# Patient Record
Sex: MALE | Race: BLACK OR AFRICAN AMERICAN | NOT HISPANIC OR LATINO | URBAN - METROPOLITAN AREA
[De-identification: names, ages, dates, MRNs, and addresses within clinical notes are randomized per-mention and may not be internally consistent; named-entity substitution may affect disease eponyms.]

---

## 2023-12-21 DIAGNOSIS — Z80.0 FAMILY HISTORY OF LYNCH SYNDROME: ICD-10-CM

## 2024-03-05 ENCOUNTER — TELEMEDICINE CLINICAL SUPPORT (OUTPATIENT)
Dept: GENETICS | Facility: CLINIC | Age: 54
End: 2024-03-05
Payer: COMMERCIAL

## 2024-03-05 DIAGNOSIS — Z80.3 FAMILY HISTORY OF BREAST CANCER: ICD-10-CM

## 2024-03-05 DIAGNOSIS — Z80.0 FAMILY HISTORY OF LYNCH SYNDROME: ICD-10-CM

## 2024-03-05 DIAGNOSIS — Z71.83 ENCOUNTER FOR NONPROCREATIVE GENETIC COUNSELING: Primary | ICD-10-CM

## 2024-03-05 DIAGNOSIS — Z80.42 FAMILY HISTORY OF PROSTATE CANCER: ICD-10-CM

## 2024-03-05 DIAGNOSIS — Z80.0 FAMILY HISTORY OF COLON CANCER: ICD-10-CM

## 2024-03-05 PROCEDURE — 96040 PR MEDICAL GENETICS COUNSELING EACH 30 MINUTES: CPT | Performed by: GENETIC COUNSELOR, MS

## 2024-03-05 NOTE — PROGRESS NOTES
History of Present Illness:  Tayo Ram is a 53 y.o. male with a paternal family history of Fierro syndrome. He reports that his father and paternal grandfather both had colon cancer, and his father was found to have Fierro syndrome. A copy of his father's genetic test report was not available for review at this time, so we have not confirmed the familial mutation or the MMR gene involved. Mr. Ram was referred to the Cancer [facility]etics [facility] at [facility] by his father's physician, Dr. Stafford.  Mr. Ram is interested in genetic testing to clarify his personal risk for cancer, as well as the risks to his children.    Cancer medical history:  Personal history of cancer? No     Prior genetic testing? No     Cancer screening history:  Prostate?  Patient states he has had DAIJA but not regularly. Unsure about PSA.   Colonoscopy? Yes, x 2. Most recent was in , and his first was in .  GI recommended repeat in 5 years for surveillance. Patient reports personal history of 1-2 colorectal polyps found on the first colonoscopy (report not on file) but no polyps on his most recent colonoscopy.  Upper endoscopy?  Yes, in his 20s, due to GI issues.   Dermatology? No  Other cancer screening? No    Family history:  A 4-generation pedigree was obtained and was significant for the following:   - Father with history of colon cancer and Fierro syndrome (genetic test report not provided for review at the time of today's visit).  He was initially diagnosed with colon cancer in his 50s, and later had a recurrence vs new primary colon cancer in his mid 70s.  He was treated at , and his surgeon is Dr. Stafford.   - Paternal grandfather, colon cancer and prostate cancer, diagnosed at uncertain ages,  of cancer in his 70s.   - Paternal aunt, breast cancer diagnosed at uncertain age,  in her mid 50s - early 60s.    - Maternal first cousin, breast cancer diagnosed at uncertain age,  at age 52.     He  reports that there may be additional instances of cancer in his maternal family (maternal aunts or uncles); however, there is limited information. He plans to reach out to his mother to get more information about his maternal family history.     Maternal and paternal ancestry is . There is no known Ashkenazi Amish ancestry. Consanguinity was denied.     Genetic Counseling:  Mr. Ram is a 53-year-old male with a family history of Fierro syndrome. He reports that his father and paternal grandfather both had colon cancer, and his father was found to have Fierro syndrome. A copy of his genetic test report was not available for review at the time of today's consult, so the familial mutation has not been confirmed at this time.      We reviewed genes, the concept of hereditary cancer, and Fierro syndrome. We discussed that most cancers are not due to an inherited genetic susceptibility. However, in about 5-10% of cases, there is an inherited genetic mutation that can make a person more susceptible to developing certain types of cancer. Within families with a genetic predisposition to cancer, we often see certain patterns, such as multiple family members with cancer and cancers occurring in multiple generations. In addition, earlier onset and/or bilateral cancers are suggestive of an inherited predisposition. There also tends to be a clustering of certain types of cancer in these families, such as colon cancer and uterine cancer.     We discussed Fierro syndrome, an autosomal dominant condition caused by germline mutations in one of five genes - MLH1, MSH2, MSH6, PMS2, and EPCAM. Mutation carriers have an increased risk to develop colorectal cancer over their lifetime, as well as an increased risk for a second primary colon cancer. In addition to the risk for colon cancer, women with Fierro syndrome also have an increased risk for endometrial (uterine) cancer and ovarian cancer. Other cancers associated with  Fierro syndrome include gastric cancer, hepatobiliary, small bowel, urinary tract, and rarely pancreatic cancer. For individuals with Fierro syndrome, there are medical management options, such as more frequent colonoscopies, that can be considered to help manage the risk for cancer.      Gene mutations in the Fierro syndrome genes are inherited in an autosomal dominant fashion. This means that if an individual has a mutation in one of these genes, their siblings and children have a 50% chance of also having the same gene mutation and a 50% chance of not having the mutation. Thus, Mr. Ram would have a 50% chance to have the same mutation as his brothers.      Based on his father's history, Mr. Ram meets current national (NCCN) criteria for testing for Fierro syndrome. Genetic testing is medically necessary, as the results would determine the need for more frequency colonoscopies, upper endoscopies, etc.  Testing for Mr. Ram would also determine whether his children are at risk for Fierro syndrome.  Mr. Ram is interested in moving forward with genetic testing, but we discussed that it would be helpful to first obtain a copy of his father's results.     Mr. Ram was counseled about hereditary cancer susceptibility including cancer risks, options for increased screening and/or risk reduction, genetic testing, and the implications for other family members.      We discussed the Genetic Information Nondiscrimination Act of 2008 (SULTANA), which is a federal law that protects people from genetic discrimination in health insurance and employment. There are some exceptions to SULTANA. SULTANA does not apply to employers with fewer than 15 employees. Additionally, the protections of SULTANA do not apply to federal employees enrolled in the Federal Employees Health Benefits program, members of the [place]  who receive their care through [facility], and veterans who receive their care through the [facility]; however, these groups  have policies in place that provide protections similar to SULTANA. We also discussed that SULTANA does not apply to life, disability, and long-termcare insurance.     We discussed performing testing for Fierro syndrome via single-site analysis or in the context of a multi-gene panel test.   To help guide his genetic testing, Mr. Ram was encouraged to obtain a copy of his father's genetic test report or see if his father would be willing to sign a release form that would allow us to look for genetic testing records that may be in his  chart.  Documentation of his father's genetic test results would help with our interpretation of Mr. Ram's results, especially if negative, as it would avoid the possibility of an uninformative result for Mr. Ram.       Mr. Ram is interested in testing for Fierro syndrome but opted to defer testing at this time. He plans to first reach out to his father to ask if would sign a  medical records release form. In the meantime, he also plans to obtain more information about his maternal family history of cancer to better inform the plan for testing. He will keep me updated and is welcome to reach out with any questions or concerns.       Plan:  - Mr. Ram is interested in moving forward with genetic testing for Fierro syndrome but will wait on his father's genetic testing records before we place order. To help with this, he was sent a  medical records release form, which he plans to give to his father to complete and sign. Since his father was treated for his colon cancer at , his genetic testing records would likely be on file, but we would need his father's permission in order to access these records and discuss with Mr. Ram.  - In the meantime, Mr. Ram plans to ask his mother for more information about his maternal fhx, as this information may impact the testing plan.   As it stands, he is currently leaning toward single site testing.   - We remain available to   Yo at [phone] if any questions arise regarding information discussed at today's visit.    Aubree Wallace MGC, [facility]  Licensed Genetic Counselor  [facility]  Ph: [phone]    Reviewed by:  Anthony Bolanos MD  Clinical   [facility]    Time spent with patient: 58 minutes, virtual